# Patient Record
Sex: MALE | Race: WHITE | NOT HISPANIC OR LATINO | URBAN - NONMETROPOLITAN AREA
[De-identification: names, ages, dates, MRNs, and addresses within clinical notes are randomized per-mention and may not be internally consistent; named-entity substitution may affect disease eponyms.]

---

## 2018-01-15 ENCOUNTER — OFFICE VISIT (OUTPATIENT)
Dept: URGENT CARE | Facility: PHYSICIAN GROUP | Age: 23
End: 2018-01-15

## 2018-01-15 DIAGNOSIS — Z02.1 PRE-EMPLOYMENT DRUG SCREENING: ICD-10-CM

## 2018-01-15 LAB
AMP AMPHETAMINE: NORMAL
COC COCAINE: NORMAL
INT CON NEG: NORMAL
INT CON POS: NORMAL
MET METHAMPHETAMINES: NORMAL
OPI OPIATES: NORMAL
PCP PHENCYCLIDINE: NORMAL
POC DRUG COMMENT 753798-OCCUPATIONAL HEALTH: NEGATIVE
THC: NORMAL

## 2018-01-15 PROCEDURE — 80305 DRUG TEST PRSMV DIR OPT OBS: CPT | Performed by: PHYSICIAN ASSISTANT

## 2019-05-19 ENCOUNTER — HOSPITAL ENCOUNTER (INPATIENT)
Facility: MEDICAL CENTER | Age: 24
LOS: 1 days | DRG: 558 | End: 2019-05-20
Attending: EMERGENCY MEDICINE | Admitting: INTERNAL MEDICINE
Payer: COMMERCIAL

## 2019-05-19 ENCOUNTER — APPOINTMENT (OUTPATIENT)
Dept: RADIOLOGY | Facility: MEDICAL CENTER | Age: 24
DRG: 558 | End: 2019-05-19
Attending: EMERGENCY MEDICINE
Payer: COMMERCIAL

## 2019-05-19 DIAGNOSIS — M65.9 FLEXOR TENOSYNOVITIS OF FINGER: ICD-10-CM

## 2019-05-19 DIAGNOSIS — F32.A DEPRESSION, UNSPECIFIED DEPRESSION TYPE: ICD-10-CM

## 2019-05-19 PROBLEM — E66.9 OBESITY: Status: ACTIVE | Noted: 2019-05-19

## 2019-05-19 PROBLEM — Z72.0 TOBACCO ABUSE: Status: ACTIVE | Noted: 2019-05-19

## 2019-05-19 PROBLEM — G89.29 CHRONIC PAIN: Status: ACTIVE | Noted: 2019-05-19

## 2019-05-19 PROBLEM — F11.10 OPIOID ABUSE (HCC): Status: ACTIVE | Noted: 2019-05-19

## 2019-05-19 LAB
ALBUMIN SERPL BCP-MCNC: 4.3 G/DL (ref 3.2–4.9)
ALBUMIN/GLOB SERPL: 1.7 G/DL
ALP SERPL-CCNC: 48 U/L (ref 30–99)
ALT SERPL-CCNC: 25 U/L (ref 2–50)
ANION GAP SERPL CALC-SCNC: 8 MMOL/L (ref 0–11.9)
AST SERPL-CCNC: 19 U/L (ref 12–45)
BASOPHILS # BLD AUTO: 0.7 % (ref 0–1.8)
BASOPHILS # BLD: 0.04 K/UL (ref 0–0.12)
BILIRUB SERPL-MCNC: 0.3 MG/DL (ref 0.1–1.5)
BUN SERPL-MCNC: 8 MG/DL (ref 8–22)
CALCIUM SERPL-MCNC: 9.2 MG/DL (ref 8.5–10.5)
CHLORIDE SERPL-SCNC: 104 MMOL/L (ref 96–112)
CO2 SERPL-SCNC: 29 MMOL/L (ref 20–33)
CREAT SERPL-MCNC: 0.95 MG/DL (ref 0.5–1.4)
CRP SERPL HS-MCNC: 0.33 MG/DL (ref 0–0.75)
EOSINOPHIL # BLD AUTO: 0.14 K/UL (ref 0–0.51)
EOSINOPHIL NFR BLD: 2.4 % (ref 0–6.9)
ERYTHROCYTE [DISTWIDTH] IN BLOOD BY AUTOMATED COUNT: 39 FL (ref 35.9–50)
ERYTHROCYTE [SEDIMENTATION RATE] IN BLOOD BY WESTERGREN METHOD: 3 MM/HOUR (ref 0–15)
GLOBULIN SER CALC-MCNC: 2.5 G/DL (ref 1.9–3.5)
GLUCOSE SERPL-MCNC: 132 MG/DL (ref 65–99)
HCT VFR BLD AUTO: 44 % (ref 42–52)
HGB BLD-MCNC: 14.5 G/DL (ref 14–18)
IMM GRANULOCYTES # BLD AUTO: 0.01 K/UL (ref 0–0.11)
IMM GRANULOCYTES NFR BLD AUTO: 0.2 % (ref 0–0.9)
LYMPHOCYTES # BLD AUTO: 2.2 K/UL (ref 1–4.8)
LYMPHOCYTES NFR BLD: 37 % (ref 22–41)
MCH RBC QN AUTO: 28.4 PG (ref 27–33)
MCHC RBC AUTO-ENTMCNC: 33 G/DL (ref 33.7–35.3)
MCV RBC AUTO: 86.3 FL (ref 81.4–97.8)
MONOCYTES # BLD AUTO: 0.32 K/UL (ref 0–0.85)
MONOCYTES NFR BLD AUTO: 5.4 % (ref 0–13.4)
NEUTROPHILS # BLD AUTO: 3.24 K/UL (ref 1.82–7.42)
NEUTROPHILS NFR BLD: 54.3 % (ref 44–72)
NRBC # BLD AUTO: 0 K/UL
NRBC BLD-RTO: 0 /100 WBC
PLATELET # BLD AUTO: 175 K/UL (ref 164–446)
PMV BLD AUTO: 10.3 FL (ref 9–12.9)
POTASSIUM SERPL-SCNC: 4.1 MMOL/L (ref 3.6–5.5)
PROT SERPL-MCNC: 6.8 G/DL (ref 6–8.2)
RBC # BLD AUTO: 5.1 M/UL (ref 4.7–6.1)
SODIUM SERPL-SCNC: 141 MMOL/L (ref 135–145)
WBC # BLD AUTO: 6 K/UL (ref 4.8–10.8)

## 2019-05-19 PROCEDURE — 700102 HCHG RX REV CODE 250 W/ 637 OVERRIDE(OP)

## 2019-05-19 PROCEDURE — 99223 1ST HOSP IP/OBS HIGH 75: CPT | Mod: AI | Performed by: INTERNAL MEDICINE

## 2019-05-19 PROCEDURE — 99407 BEHAV CHNG SMOKING > 10 MIN: CPT | Performed by: INTERNAL MEDICINE

## 2019-05-19 PROCEDURE — A9270 NON-COVERED ITEM OR SERVICE: HCPCS | Performed by: FAMILY MEDICINE

## 2019-05-19 PROCEDURE — 85025 COMPLETE CBC W/AUTO DIFF WBC: CPT

## 2019-05-19 PROCEDURE — 700105 HCHG RX REV CODE 258: Performed by: INTERNAL MEDICINE

## 2019-05-19 PROCEDURE — 700111 HCHG RX REV CODE 636 W/ 250 OVERRIDE (IP): Performed by: INTERNAL MEDICINE

## 2019-05-19 PROCEDURE — 90715 TDAP VACCINE 7 YRS/> IM: CPT | Performed by: EMERGENCY MEDICINE

## 2019-05-19 PROCEDURE — 700111 HCHG RX REV CODE 636 W/ 250 OVERRIDE (IP)

## 2019-05-19 PROCEDURE — 770006 HCHG ROOM/CARE - MED/SURG/GYN SEMI*

## 2019-05-19 PROCEDURE — 96365 THER/PROPH/DIAG IV INF INIT: CPT

## 2019-05-19 PROCEDURE — 86140 C-REACTIVE PROTEIN: CPT

## 2019-05-19 PROCEDURE — 700102 HCHG RX REV CODE 250 W/ 637 OVERRIDE(OP): Performed by: FAMILY MEDICINE

## 2019-05-19 PROCEDURE — 96375 TX/PRO/DX INJ NEW DRUG ADDON: CPT

## 2019-05-19 PROCEDURE — A9270 NON-COVERED ITEM OR SERVICE: HCPCS | Performed by: INTERNAL MEDICINE

## 2019-05-19 PROCEDURE — 73130 X-RAY EXAM OF HAND: CPT | Mod: RT

## 2019-05-19 PROCEDURE — 700102 HCHG RX REV CODE 250 W/ 637 OVERRIDE(OP): Performed by: INTERNAL MEDICINE

## 2019-05-19 PROCEDURE — 700111 HCHG RX REV CODE 636 W/ 250 OVERRIDE (IP): Performed by: EMERGENCY MEDICINE

## 2019-05-19 PROCEDURE — A9270 NON-COVERED ITEM OR SERVICE: HCPCS | Performed by: PHYSICIAN ASSISTANT

## 2019-05-19 PROCEDURE — 700105 HCHG RX REV CODE 258: Performed by: EMERGENCY MEDICINE

## 2019-05-19 PROCEDURE — 51798 US URINE CAPACITY MEASURE: CPT

## 2019-05-19 PROCEDURE — 85652 RBC SED RATE AUTOMATED: CPT

## 2019-05-19 PROCEDURE — 90471 IMMUNIZATION ADMIN: CPT

## 2019-05-19 PROCEDURE — 99285 EMERGENCY DEPT VISIT HI MDM: CPT

## 2019-05-19 PROCEDURE — A9270 NON-COVERED ITEM OR SERVICE: HCPCS

## 2019-05-19 PROCEDURE — 96366 THER/PROPH/DIAG IV INF ADDON: CPT

## 2019-05-19 PROCEDURE — 700102 HCHG RX REV CODE 250 W/ 637 OVERRIDE(OP): Performed by: PHYSICIAN ASSISTANT

## 2019-05-19 PROCEDURE — 80053 COMPREHEN METABOLIC PANEL: CPT

## 2019-05-19 RX ORDER — DIPHENHYDRAMINE HCL 25 MG
50 TABLET ORAL EVERY 6 HOURS PRN
Status: DISCONTINUED | OUTPATIENT
Start: 2019-05-19 | End: 2019-05-20 | Stop reason: HOSPADM

## 2019-05-19 RX ORDER — PRAZOSIN HYDROCHLORIDE 1 MG/1
1 CAPSULE ORAL NIGHTLY
COMMUNITY

## 2019-05-19 RX ORDER — ZOLPIDEM TARTRATE 5 MG/1
10 TABLET ORAL NIGHTLY PRN
Status: DISCONTINUED | OUTPATIENT
Start: 2019-05-19 | End: 2019-05-20 | Stop reason: HOSPADM

## 2019-05-19 RX ORDER — BACLOFEN 10 MG/1
10 TABLET ORAL 3 TIMES DAILY
COMMUNITY

## 2019-05-19 RX ORDER — OXYCODONE HYDROCHLORIDE 5 MG/1
10 TABLET ORAL EVERY 4 HOURS PRN
Status: DISCONTINUED | OUTPATIENT
Start: 2019-05-19 | End: 2019-05-19

## 2019-05-19 RX ORDER — POLYETHYLENE GLYCOL 3350 17 G/17G
1 POWDER, FOR SOLUTION ORAL
Status: DISCONTINUED | OUTPATIENT
Start: 2019-05-19 | End: 2019-05-20 | Stop reason: HOSPADM

## 2019-05-19 RX ORDER — AMOXICILLIN 250 MG
2 CAPSULE ORAL 2 TIMES DAILY
Status: DISCONTINUED | OUTPATIENT
Start: 2019-05-19 | End: 2019-05-20 | Stop reason: HOSPADM

## 2019-05-19 RX ORDER — BUPRENORPHINE HYDROCHLORIDE AND NALOXONE HYDROCHLORIDE DIHYDRATE 8; 2 MG/1; MG/1
1 TABLET SUBLINGUAL 2 TIMES DAILY
Status: DISCONTINUED | OUTPATIENT
Start: 2019-05-19 | End: 2019-05-20 | Stop reason: HOSPADM

## 2019-05-19 RX ORDER — GABAPENTIN 300 MG/1
300 CAPSULE ORAL 3 TIMES DAILY
Status: DISCONTINUED | OUTPATIENT
Start: 2019-05-19 | End: 2019-05-20 | Stop reason: HOSPADM

## 2019-05-19 RX ORDER — SERTRALINE HYDROCHLORIDE 100 MG/1
100 TABLET, FILM COATED ORAL DAILY
COMMUNITY

## 2019-05-19 RX ORDER — BUPRENORPHINE HYDROCHLORIDE AND NALOXONE HYDROCHLORIDE DIHYDRATE 8; 2 MG/1; MG/1
1 TABLET SUBLINGUAL DAILY
COMMUNITY

## 2019-05-19 RX ORDER — SERTRALINE HYDROCHLORIDE 100 MG/1
100 TABLET, FILM COATED ORAL DAILY
Status: DISCONTINUED | OUTPATIENT
Start: 2019-05-19 | End: 2019-05-20 | Stop reason: HOSPADM

## 2019-05-19 RX ORDER — MELOXICAM 7.5 MG/1
7.5 TABLET ORAL DAILY
COMMUNITY

## 2019-05-19 RX ORDER — ONDANSETRON 2 MG/ML
4 INJECTION INTRAMUSCULAR; INTRAVENOUS EVERY 4 HOURS PRN
Status: DISCONTINUED | OUTPATIENT
Start: 2019-05-19 | End: 2019-05-20 | Stop reason: HOSPADM

## 2019-05-19 RX ORDER — GABAPENTIN 300 MG/1
300 CAPSULE ORAL 3 TIMES DAILY
COMMUNITY

## 2019-05-19 RX ORDER — HYDROMORPHONE HYDROCHLORIDE 1 MG/ML
0.5 INJECTION, SOLUTION INTRAMUSCULAR; INTRAVENOUS; SUBCUTANEOUS ONCE
Status: COMPLETED | OUTPATIENT
Start: 2019-05-19 | End: 2019-05-19

## 2019-05-19 RX ORDER — KETOROLAC TROMETHAMINE 30 MG/ML
15 INJECTION, SOLUTION INTRAMUSCULAR; INTRAVENOUS ONCE
Status: COMPLETED | OUTPATIENT
Start: 2019-05-19 | End: 2019-05-19

## 2019-05-19 RX ORDER — ACETAMINOPHEN 325 MG/1
650 TABLET ORAL EVERY 6 HOURS PRN
Status: DISCONTINUED | OUTPATIENT
Start: 2019-05-19 | End: 2019-05-20 | Stop reason: HOSPADM

## 2019-05-19 RX ORDER — PRAZOSIN HYDROCHLORIDE 1 MG/1
1 CAPSULE ORAL NIGHTLY
Status: DISCONTINUED | OUTPATIENT
Start: 2019-05-19 | End: 2019-05-20 | Stop reason: HOSPADM

## 2019-05-19 RX ORDER — KETOROLAC TROMETHAMINE 30 MG/ML
30 INJECTION, SOLUTION INTRAMUSCULAR; INTRAVENOUS EVERY 6 HOURS PRN
Status: DISCONTINUED | OUTPATIENT
Start: 2019-05-19 | End: 2019-05-20 | Stop reason: HOSPADM

## 2019-05-19 RX ORDER — BISACODYL 10 MG
10 SUPPOSITORY, RECTAL RECTAL
Status: DISCONTINUED | OUTPATIENT
Start: 2019-05-19 | End: 2019-05-20 | Stop reason: HOSPADM

## 2019-05-19 RX ORDER — ALPRAZOLAM 0.5 MG/1
0.5 TABLET ORAL 3 TIMES DAILY PRN
Status: DISCONTINUED | OUTPATIENT
Start: 2019-05-19 | End: 2019-05-20 | Stop reason: HOSPADM

## 2019-05-19 RX ORDER — SODIUM CHLORIDE 9 MG/ML
INJECTION, SOLUTION INTRAVENOUS CONTINUOUS
Status: DISCONTINUED | OUTPATIENT
Start: 2019-05-19 | End: 2019-05-20 | Stop reason: HOSPADM

## 2019-05-19 RX ORDER — IBUPROFEN 800 MG/1
800 TABLET ORAL EVERY 6 HOURS PRN
Status: DISCONTINUED | OUTPATIENT
Start: 2019-05-19 | End: 2019-05-20 | Stop reason: HOSPADM

## 2019-05-19 RX ORDER — MAGNESIUM OXIDE 400 MG/1
400 TABLET ORAL DAILY
COMMUNITY

## 2019-05-19 RX ORDER — SODIUM CHLORIDE 9 MG/ML
1000 INJECTION, SOLUTION INTRAVENOUS
Status: DISCONTINUED | OUTPATIENT
Start: 2019-05-19 | End: 2019-05-20 | Stop reason: HOSPADM

## 2019-05-19 RX ORDER — IBUPROFEN 800 MG/1
800 TABLET ORAL EVERY 6 HOURS PRN
Status: DISCONTINUED | OUTPATIENT
Start: 2019-05-20 | End: 2019-05-19

## 2019-05-19 RX ORDER — OXYCODONE HYDROCHLORIDE AND ACETAMINOPHEN 5; 325 MG/1; MG/1
1 TABLET ORAL ONCE
Status: COMPLETED | OUTPATIENT
Start: 2019-05-19 | End: 2019-05-19

## 2019-05-19 RX ORDER — MELOXICAM 7.5 MG/1
7.5 TABLET ORAL DAILY
Status: DISCONTINUED | OUTPATIENT
Start: 2019-05-19 | End: 2019-05-19

## 2019-05-19 RX ORDER — ONDANSETRON 4 MG/1
4 TABLET, ORALLY DISINTEGRATING ORAL EVERY 4 HOURS PRN
Status: DISCONTINUED | OUTPATIENT
Start: 2019-05-19 | End: 2019-05-20 | Stop reason: HOSPADM

## 2019-05-19 RX ORDER — BUPRENORPHINE HYDROCHLORIDE AND NALOXONE HYDROCHLORIDE DIHYDRATE 8; 2 MG/1; MG/1
1 TABLET SUBLINGUAL DAILY
Status: DISCONTINUED | OUTPATIENT
Start: 2019-05-19 | End: 2019-05-19

## 2019-05-19 RX ADMIN — SODIUM CHLORIDE: 9 INJECTION, SOLUTION INTRAVENOUS at 08:13

## 2019-05-19 RX ADMIN — GABAPENTIN 300 MG: 300 CAPSULE ORAL at 17:22

## 2019-05-19 RX ADMIN — BUPRENORPHINE HYDROCHLORIDE AND NALOXONE HYDROCHLORIDE DIHYDRATE 1 TABLET: 8; 2 TABLET SUBLINGUAL at 17:22

## 2019-05-19 RX ADMIN — SERTRALINE HYDROCHLORIDE 100 MG: 100 TABLET ORAL at 05:55

## 2019-05-19 RX ADMIN — VANCOMYCIN HYDROCHLORIDE 1700 MG: 100 INJECTION, POWDER, LYOPHILIZED, FOR SOLUTION INTRAVENOUS at 18:15

## 2019-05-19 RX ADMIN — ALPRAZOLAM 0.5 MG: 0.5 TABLET ORAL at 15:36

## 2019-05-19 RX ADMIN — VANCOMYCIN HYDROCHLORIDE 1700 MG: 100 INJECTION, POWDER, LYOPHILIZED, FOR SOLUTION INTRAVENOUS at 12:09

## 2019-05-19 RX ADMIN — SODIUM CHLORIDE: 9 INJECTION, SOLUTION INTRAVENOUS at 23:27

## 2019-05-19 RX ADMIN — VANCOMYCIN HYDROCHLORIDE 2800 MG: 100 INJECTION, POWDER, LYOPHILIZED, FOR SOLUTION INTRAVENOUS at 03:08

## 2019-05-19 RX ADMIN — KETOROLAC TROMETHAMINE 30 MG: 30 INJECTION, SOLUTION INTRAMUSCULAR; INTRAVENOUS at 21:43

## 2019-05-19 RX ADMIN — ALPRAZOLAM 0.5 MG: 0.5 TABLET ORAL at 10:46

## 2019-05-19 RX ADMIN — OXYCODONE AND ACETAMINOPHEN 1 TABLET: 5; 325 TABLET ORAL at 02:39

## 2019-05-19 RX ADMIN — KETOROLAC TROMETHAMINE 15 MG: 30 INJECTION, SOLUTION INTRAMUSCULAR; INTRAVENOUS at 02:39

## 2019-05-19 RX ADMIN — ACETAMINOPHEN 650 MG: 325 TABLET, FILM COATED ORAL at 04:59

## 2019-05-19 RX ADMIN — GABAPENTIN 300 MG: 300 CAPSULE ORAL at 11:09

## 2019-05-19 RX ADMIN — KETOROLAC TROMETHAMINE 30 MG: 30 INJECTION, SOLUTION INTRAMUSCULAR; INTRAVENOUS at 08:51

## 2019-05-19 RX ADMIN — ZOLPIDEM TARTRATE 10 MG: 5 TABLET ORAL at 21:43

## 2019-05-19 RX ADMIN — IBUPROFEN 800 MG: 800 TABLET, FILM COATED ORAL at 18:15

## 2019-05-19 RX ADMIN — SODIUM CHLORIDE 3 G: 900 INJECTION INTRAVENOUS at 17:23

## 2019-05-19 RX ADMIN — PRAZOSIN HYDROCHLORIDE 1 MG: 1 CAPSULE ORAL at 20:13

## 2019-05-19 RX ADMIN — ACETAMINOPHEN 650 MG: 325 TABLET, FILM COATED ORAL at 11:09

## 2019-05-19 RX ADMIN — CLOSTRIDIUM TETANI TOXOID ANTIGEN (FORMALDEHYDE INACTIVATED), CORYNEBACTERIUM DIPHTHERIAE TOXOID ANTIGEN (FORMALDEHYDE INACTIVATED), BORDETELLA PERTUSSIS TOXOID ANTIGEN (GLUTARALDEHYDE INACTIVATED), BORDETELLA PERTUSSIS FILAMENTOUS HEMAGGLUTININ ANTIGEN (FORMALDEHYDE INACTIVATED), BORDETELLA PERTUSSIS PERTACTIN ANTIGEN, AND BORDETELLA PERTUSSIS FIMBRIAE 2/3 ANTIGEN 0.5 ML: 5; 2; 2.5; 5; 3; 5 INJECTION, SUSPENSION INTRAMUSCULAR at 01:46

## 2019-05-19 RX ADMIN — DIPHENHYDRAMINE HCL 50 MG: 25 TABLET ORAL at 18:15

## 2019-05-19 RX ADMIN — SENNOSIDES, DOCUSATE SODIUM 2 TABLET: 50; 8.6 TABLET, FILM COATED ORAL at 17:22

## 2019-05-19 RX ADMIN — KETOROLAC TROMETHAMINE 30 MG: 30 INJECTION, SOLUTION INTRAMUSCULAR; INTRAVENOUS at 15:10

## 2019-05-19 RX ADMIN — ALPRAZOLAM 0.5 MG: 0.5 TABLET ORAL at 23:19

## 2019-05-19 RX ADMIN — MELOXICAM 7.5 MG: 7.5 TABLET ORAL at 05:55

## 2019-05-19 RX ADMIN — SODIUM CHLORIDE 3 G: 900 INJECTION INTRAVENOUS at 05:02

## 2019-05-19 RX ADMIN — SODIUM CHLORIDE 3 G: 900 INJECTION INTRAVENOUS at 23:21

## 2019-05-19 RX ADMIN — SODIUM CHLORIDE 3 G: 900 INJECTION INTRAVENOUS at 11:09

## 2019-05-19 RX ADMIN — HYDROMORPHONE HYDROCHLORIDE 0.5 MG: 1 INJECTION, SOLUTION INTRAMUSCULAR; INTRAVENOUS; SUBCUTANEOUS at 01:47

## 2019-05-19 RX ADMIN — BUPRENORPHINE HYDROCHLORIDE AND NALOXONE HYDROCHLORIDE DIHYDRATE 1 TABLET: 8; 2 TABLET SUBLINGUAL at 05:55

## 2019-05-19 RX ADMIN — GABAPENTIN 300 MG: 300 CAPSULE ORAL at 05:55

## 2019-05-19 ASSESSMENT — COGNITIVE AND FUNCTIONAL STATUS - GENERAL
DAILY ACTIVITIY SCORE: 21
SUGGESTED CMS G CODE MODIFIER MOBILITY: CH
DRESSING REGULAR UPPER BODY CLOTHING: A LITTLE
SUGGESTED CMS G CODE MODIFIER DAILY ACTIVITY: CJ
HELP NEEDED FOR BATHING: A LITTLE
DRESSING REGULAR LOWER BODY CLOTHING: A LITTLE
MOBILITY SCORE: 24

## 2019-05-19 ASSESSMENT — ENCOUNTER SYMPTOMS
HEADACHES: 0
DIARRHEA: 0
SPUTUM PRODUCTION: 0
SHORTNESS OF BREATH: 0
VOMITING: 0
NAUSEA: 0
BLURRED VISION: 0
BACK PAIN: 0
SEIZURES: 0
WHEEZING: 0
COUGH: 0
PALPITATIONS: 0
FEVER: 0
SORE THROAT: 0
DIZZINESS: 0
FOCAL WEAKNESS: 0
ABDOMINAL PAIN: 0
DIAPHORESIS: 0
BRUISES/BLEEDS EASILY: 0
BLOOD IN STOOL: 0
NECK PAIN: 0
CHILLS: 0
DEPRESSION: 1
FLANK PAIN: 0
MYALGIAS: 0

## 2019-05-19 ASSESSMENT — PATIENT HEALTH QUESTIONNAIRE - PHQ9
1. LITTLE INTEREST OR PLEASURE IN DOING THINGS: NOT AT ALL
SUM OF ALL RESPONSES TO PHQ9 QUESTIONS 1 AND 2: 0
2. FEELING DOWN, DEPRESSED, IRRITABLE, OR HOPELESS: NOT AT ALL

## 2019-05-19 ASSESSMENT — LIFESTYLE VARIABLES
EVER_SMOKED: YES
ALCOHOL_USE: NO

## 2019-05-19 ASSESSMENT — COPD QUESTIONNAIRES
DURING THE PAST 4 WEEKS HOW MUCH DID YOU FEEL SHORT OF BREATH: SOME OF THE TIME
DO YOU EVER COUGH UP ANY MUCUS OR PHLEGM?: YES, A FEW DAYS A WEEK OR MONTH
IN THE PAST 12 MONTHS DO YOU DO LESS THAN YOU USED TO BECAUSE OF YOUR BREATHING PROBLEMS: DISAGREE/UNSURE
COPD SCREENING SCORE: 4
HAVE YOU SMOKED AT LEAST 100 CIGARETTES IN YOUR ENTIRE LIFE: YES

## 2019-05-19 NOTE — CONSULTS
"5/19/2019    Jamey Gómez is a 24 y.o. male who presents after a mirror laceration with a right hand infection and is here for operative management. Patient denies numbness, parasthesias, loss of concousness or other trauma    Past Medical History:   Diagnosis Date   • Abnormal weight gain 6/15/2010   • Depression    • Hypoglycemia 6/15/2010   • Opioid abuse (HCC)    • PTSD (post-traumatic stress disorder)    • Suicidal ideations        Past Surgical History:   Procedure Laterality Date   • TONSILLECTOMY  4/2011       Medications  No current facility-administered medications on file prior to encounter.      Current Outpatient Prescriptions on File Prior to Encounter   Medication Sig Dispense Refill   • azithromycin (ZITHROMAX) 500 MG tablet Take 1 Tab by mouth every day. as directed 3 Tab 1       Allergies  Patient has no known allergies.    ROS  Right hand pain. All other systems were reviewed and found to be negative    History reviewed. No pertinent family history.    Social History     Social History   • Marital status: Single     Spouse name: N/A   • Number of children: N/A   • Years of education: N/A     Social History Main Topics   • Smoking status: Current Every Day Smoker     Packs/day: 1.00   • Smokeless tobacco: Never Used   • Alcohol use No   • Drug use: Yes      Comment: cocaine   • Sexual activity: Not on file     Other Topics Concern   • Not on file     Social History Narrative   • No narrative on file       Physical Exam  Vitals  /60   Pulse 84   Temp 36.6 °C (97.9 °F) (Temporal)   Resp 16   Ht 1.88 m (6' 2\")   Wt 113.4 kg (250 lb)   General: Well Developed, Well Nourished, no acute distress  HEENT: Normocephalic, atraumatic  Eyes: Anicteric, PERRLA, EOMI  Neck: Supple, nontender, no masses  Lungs: CTA, no wheezes or crackles  Heart: RRR, no murmurs, rubs or gallops  Abdomen: Soft, NT, ND  Pelvis: Stable to AP and Lateral Compression  Skin: Intact, no open wounds  Extremities: Right hand " pain and swelling, positive Kanavals signs  Neuro: M/R/U/A intact  Vascular: 2+rad/Uln, Capillary refill <2 seconds    Radiographs:  DX-HAND 3+ RIGHT   Final Result         1.  No acute traumatic bony injury.   2.  Small irregular radiodense foreign body along the radial aspect of the long finger PIP joint, appearance suggests soft tissue foreign body.          Laboratory Values  Recent Labs      05/19/19   0145   WBC  6.0   RBC  5.10   HEMOGLOBIN  14.5   HEMATOCRIT  44.0   MCV  86.3   MCH  28.4   MCHC  33.0*   RDW  39.0   PLATELETCT  175   MPV  10.3     Recent Labs      05/19/19   0145   SODIUM  141   POTASSIUM  4.1   CHLORIDE  104   CO2  29   GLUCOSE  132*   BUN  8             Impression:Flexor tenosynovitis Right hand    Plan:Operative intervention if no improvement with IV ABX in 24 hrs

## 2019-05-19 NOTE — DISCHARGE PLANNING
Care Transition Team Discharge Planning    Anticipated Discharge Disposition: TBD    Action: Lsw spoke to bedside RN and MD. Pt is upset as [facility] and [facility] to return. Pt will most likely not have his hand operated on.    Lsw met w/ pt who reported he wants an advocate. Pt states he has had a horrible three month period. He is very upset w/ the care he received earlier today. He requested and received the contact number for the hospital patient advocate. Lsw indicated the contact number is a voice mail and pt is instructed to leave a report. They will investigate and f/u up w/ him.       Barriers to Discharge: TBD    Plan: f/u w/ medical team

## 2019-05-19 NOTE — H&P
Hospital Medicine History & Physical Note    Date of Service  5/19/2019    Primary Care Physician  RUPESH Curtis.    Consultants  Ortho     Code Status  Full code    Chief Complaint  Right hand pain    History of Presenting Illness  24 y.o. male with a past medical history of depression, PTSD, chronic pain, opioid dependence who presented 5/19/2019 with right hand pain.  Patient injured his right hand on Monday after falling into room air.  Since then he has noted progressively worsening pain and is now unable to straighten his fingers of his right hand.  Pain is worse with movement.  He denies any fevers or chills.  He denies any head injury, numbness or tingling.    Review of Systems  Review of Systems   Constitutional: Negative for chills, diaphoresis and fever.   HENT: Negative for hearing loss and sore throat.    Eyes: Negative for blurred vision.   Respiratory: Negative for cough, sputum production, shortness of breath and wheezing.    Cardiovascular: Negative for chest pain, palpitations and leg swelling.   Gastrointestinal: Negative for abdominal pain, blood in stool, diarrhea, nausea and vomiting.   Genitourinary: Negative for dysuria, flank pain and urgency.   Musculoskeletal: Negative for back pain, joint pain, myalgias and neck pain.        Right hand pain   Skin: Negative for rash.        Multiple abrasions noted on right hand   Neurological: Negative for dizziness, focal weakness, seizures and headaches.   Endo/Heme/Allergies: Does not bruise/bleed easily.   Psychiatric/Behavioral: Positive for depression. Negative for suicidal ideas.   All other systems reviewed and are negative.      Past Medical History   has a past medical history of Abnormal weight gain (6/15/2010); Depression; Hypoglycemia (6/15/2010); Opioid abuse (HCC); PTSD (post-traumatic stress disorder); and Suicidal ideations.    Surgical History   has a past surgical history that includes tonsillectomy (4/2011).      Family History  No pertinent family history    Social History   reports that he has been smoking.  He has been smoking about 1.00 pack per day. He has never used smokeless tobacco. He reports that he uses drugs. He reports that he does not drink alcohol.    Allergies  No Known Allergies    Medications  Prior to Admission Medications   Prescriptions Last Dose Informant Patient Reported? Taking?   azithromycin (ZITHROMAX) 500 MG tablet   No No   Sig: Take 1 Tab by mouth every day. as directed   baclofen (LIORESAL) 10 MG Tab   Yes Yes   Sig: Take 10 mg by mouth 3 times a day.   buprenorphine-naloxone (SUBOXONE) 8-2 MG SL Tab   Yes Yes   Sig: Place 1 Tab under tongue every day.   cholecalciferol (VITAMIN D3) 400 UNIT Tab   Yes Yes   Sig: Take 400 Units by mouth every day.   gabapentin (NEURONTIN) 300 MG Cap   Yes Yes   Sig: Take 300 mg by mouth 3 times a day.   magnesium oxide (MAG-OX) 400 MG Tab   Yes Yes   Sig: Take 400 mg by mouth every day.   meloxicam (MOBIC) 7.5 MG Tab   Yes Yes   Sig: Take 7.5 mg by mouth every day.   prazosin (MINIPRESS) 1 MG Cap   Yes Yes   Sig: Take 1 mg by mouth every evening.   sertraline (ZOLOFT) 100 MG Tab   Yes Yes   Sig: Take 100 mg by mouth every day.      Facility-Administered Medications: None       Physical Exam  Temp:  [36.6 °C (97.9 °F)] 36.6 °C (97.9 °F)  Pulse:  [92] 92  Resp:  [16] 16  BP: (143)/(62) 143/62    Physical Exam   Constitutional: He is oriented to person, place, and time. He appears well-developed and well-nourished. No distress.   HENT:   Head: Normocephalic and atraumatic.   Mouth/Throat: Oropharynx is clear and moist.   Eyes: Pupils are equal, round, and reactive to light. Conjunctivae are normal. No scleral icterus.   Neck: Normal range of motion. Neck supple.   Cardiovascular: Normal rate, regular rhythm and normal heart sounds.    Pulmonary/Chest: Effort normal and breath sounds normal. No respiratory distress. He has no wheezes. He has no rales.    Abdominal: Soft. Bowel sounds are normal. He exhibits no distension. There is no tenderness. There is no rebound.   Musculoskeletal: He exhibits edema and tenderness.   Limited range of motion of right hand due to pain   Lymphadenopathy:     He has no cervical adenopathy.   Neurological: He is alert and oriented to person, place, and time. No cranial nerve deficit. Coordination normal.   Skin: Skin is warm. No rash noted.   Right hand swelling with multiple abrasions    Psychiatric: He has a normal mood and affect. His behavior is normal.   Nursing note and vitals reviewed.      Laboratory:  Recent Labs      05/19/19 0145   WBC  6.0   RBC  5.10   HEMOGLOBIN  14.5   HEMATOCRIT  44.0   MCV  86.3   MCH  28.4   MCHC  33.0*   RDW  39.0   PLATELETCT  175   MPV  10.3     Recent Labs      05/19/19 0145   SODIUM  141   POTASSIUM  4.1   CHLORIDE  104   CO2  29   GLUCOSE  132*   BUN  8   CREATININE  0.95   CALCIUM  9.2     Recent Labs      05/19/19 0145   ALTSGPT  25   ASTSGOT  19   ALKPHOSPHAT  48   TBILIRUBIN  0.3   GLUCOSE  132*                 No results for input(s): TROPONINI in the last 72 hours.    Urinalysis:    No results found     Imaging:  DX-HAND 3+ RIGHT   Final Result         1.  No acute traumatic bony injury.   2.  Small irregular radiodense foreign body along the radial aspect of the long finger PIP joint, appearance suggests soft tissue foreign body.            Assessment/Plan:  I anticipate this patient will require at least two midnights for appropriate medical management, necessitating inpatient admission.    Tenosynovitis of right hand- (present on admission)   Assessment & Plan    I have started the patient on IV Unasyn and IV vancomycin.  Monitor for vancomycin toxicity and follow trough levels  Pain control with Tylenol and IV Toradol  Orthopedics has been consulted  PTOT     Obesity- (present on admission)   Assessment & Plan    Body mass index is 32.1 kg/m².  Pt educated on the increase of  morbidity and mortality associated with excess weight including DM, Heart Disease, HTN, stroke, and sleep apnea.  Pt advised weight loss of 5% through reduced calorie, low carb diet and 150 mins of exercise a week       Tobacco abuse- (present on admission)   Assessment & Plan    Tobacco cessation education provided for more than 10 minutes, discussed options of nicotine patch, medical treatment with wellbutrin and chantix. Discussed the risks of smoking including increased risk of heart disease, stroke, cancer and COPD. Discussed the benefits of quitting smoking. Nicotine replacement protocol will be provided to the patient.       Chronic pain- (present on admission)   Assessment & Plan    Continue Mobic and gabapentin     Opioid abuse (HCC)- (present on admission)   Assessment & Plan    Continue Suboxone     Depression- (present on admission)   Assessment & Plan    Continue Zoloft         VTE prophylaxis: SCD

## 2019-05-19 NOTE — PROGRESS NOTES
"[facility].  Patient states he was told that he would have surgery when he arrived here.  Per orthopedic surgery, trial of IV antibiotics for the next 24 hours and then will be reevaluated.  Patient is upset, angry and irritable, frustrated, he was verbally abusive initially when I came into the room with the RN.  He states \"nobody has done anything here.\"  He was demanding to see a [facility] advocate, and I told him that we did not have one in the hospital but we do have a , he demanded to see a  before I could speak or examine him.  I asked the  and her hospitalist RN to come and speak with him.  He was also very anxious, trial of Xanax was given which calmed him down.  He then informed the  and hospitalist RN that he could then see me.  He immediately apologized for his initial behavior, we discussed his plan of care, trial of IV antibiotics for the next 24 hours then reevaluation.  His RN also noted that there was still a very small piece of glass in his right hand in one of the digits.  Orthopedic surgery was then informed of this finding.      "

## 2019-05-19 NOTE — ED PROVIDER NOTES
ED Provider Note    Chief Complaint:   Right hand injury    HPI:  Jamey Gómez is a 24 y.o. male who presents as a transfer patient from the [facility] out of concern for flexor tenosynovitis.  6 days ago he fell while walking in the dark.  He tripped over his dog and fell forward striking a mirror with his right hand.  He is right-hand dominant.  He sustained several superficial lacerations which he cleaned on his own.  He did not seek medical attention at that time.  He states he was able to easily flex and extend all digits.  Over the past 24 to 48 hours he has noticed progressively worsening pain localized to the palmar aspect of the hand, most pronounced in the index finger and ring finger.  He is also noticed some associated overlying redness.  Pain has worsened since time of onset, progressing to digits held in passive flexion and mild digit swelling.  Patient has not had any fevers, there is no streaking from the hand, forearm is not involved.  He has no history of diabetes, no hyperglycemia, he is a daily smoker which would impair his immunity as well as his wound healing.  Symptoms are exacerbated by moving the hand, no alleviating factors identified.    Review of Systems:  See HPI for pertinent positives and negatives. All other systems negative.    Past Medical History:   has a past medical history of Abnormal weight gain (6/15/2010); Depression; Hypoglycemia (6/15/2010); Opioid abuse (HCC); PTSD (post-traumatic stress disorder); and Suicidal ideations.    Social History:  Social History     Social History Main Topics   • Smoking status: Current Every Day Smoker     Packs/day: 1.00   • Smokeless tobacco: Never Used   • Alcohol use No   • Drug use: Yes      Comment: cocaine   • Sexual activity: Not on file       Surgical History:   has a past surgical history that includes tonsillectomy (4/2011).    Current Medications:  Home Medications     Reviewed by Ade Stratton R.N. (Registered Nurse) on  "05/19/19 at 0102  Med List Status: Partial   Medication Last Dose Status   azithromycin (ZITHROMAX) 500 MG tablet  Active   baclofen (LIORESAL) 10 MG Tab  Active   buprenorphine-naloxone (SUBOXONE) 8-2 MG SL Tab  Active   cholecalciferol (VITAMIN D3) 400 UNIT Tab  Active   gabapentin (NEURONTIN) 300 MG Cap  Active   magnesium oxide (MAG-OX) 400 MG Tab  Active   meloxicam (MOBIC) 7.5 MG Tab  Active   prazosin (MINIPRESS) 1 MG Cap  Active   sertraline (ZOLOFT) 100 MG Tab  Active                Allergies:  No Known Allergies    Physical Exam:  Vital Signs: /62   Pulse 92   Temp 36.6 °C (97.9 °F) (Temporal)   Resp 16   Ht 1.88 m (6' 2\")   Wt 113.4 kg (250 lb)   BMI 32.10 kg/m²   Constitutional: Alert, no acute distress  HENT: Moist mucus membranes  Eyes: Pupils equal and reactive, normal conjunctiva  Neck: Supple, normal range of motion, no stridor  Cardiovascular: Extremities are warm and well perfused, no murmur appreciated, normal cardiac auscultation  Pulmonary: No respiratory distress, normal work of breathing  Abdomen: Soft, non-distended  Skin: Warm, dry, no rashes or lesions  Musculoskeletal: Right hand with index and ring finger held in passive flexion, there is pain with passive or active extension, mild soft tissue swelling, sensation intact throughout radial, median, and ulnar nerve distribution, mild redness overlying the palmar aspect of the affected digits  Neurologic: Alert, oriented, normal speech, normal motor function  Psychiatric: Normal and appropriate mood and affect    [facility].  Patient was seen in the emergency department today for right hand injury.  Noted to have several small abrasions to the hand.  Additionally having difficulty straightening fingers on the right hand.  He was concerned before hand infection.  He gave a dose of Ancef and transferred the patient to this facility for further evaluation.    Labs:  Labs Reviewed "   CBC WITH DIFFERENTIAL - Abnormal; Notable for the following:        Result Value    MCHC 33.0 (*)     All other components within normal limits   COMP METABOLIC PANEL - Abnormal; Notable for the following:     Glucose 132 (*)     All other components within normal limits   WESTERGREN SED RATE   CRP QUANTITIVE (NON-CARDIAC)   ESTIMATED GFR       Radiology:  DX-HAND 3+ RIGHT   Final Result         1.  No acute traumatic bony injury.   2.  Small irregular radiodense foreign body along the radial aspect of the long finger PIP joint, appearance suggests soft tissue foreign body.             ED Medications Administered:  Medications   vancomycin 2,800 mg in  mL IVPB (not administered)   tetanus-dipth-acell pertussis (ADACEL) inj 0.5 mL (0.5 mL Intramuscular Given 5/19/19 0146)   HYDROmorphone pf (DILAUDID) injection 0.5 mg (0.5 mg Intravenous Given 5/19/19 0147)   ketorolac (TORADOL) injection 15 mg (15 mg Intravenous Given 5/19/19 0239)   oxyCODONE-acetaminophen (PERCOCET) 5-325 MG per tablet 1 Tab (1 Tab Oral Given 5/19/19 0239)       Differential diagnosis:  Cellulitis, soft tissue injury, flexor tenosynovitis, occult fracture    MDM:  History and physical exam as documented above.  Patient presents with right hand injury concerning for flexor tenosynovitis.  He was treated with Ancef and transferred to this facility for hand consultation.    Case discussed with Dr. Donaldson. Recommends adding Vancomycin with plan to monitor throughout the morning.  If no improvement, he will take the patient to the operating room later today for washout.    On laboratory evaluation CMP is reassuring.  He has a normal white blood count at 6.0, no bands resulted at this time.  ESR and CRP are within normal limits.  Plain film demonstrates no acute traumatic bony injury.  Small radiodense foreign body noted along the radial aspect of the long finger PIP joint.    Case discussed with hospitalist who kindly agrees to admit the  patient with Dr. Donaldson, orthopedic surgeon, consulting.    Disposition:  Admit to hospitalist service in guarded condition    Final Impression:  1. Flexor tenosynovitis of finger      Electronically signed by: Alexandra Sanchez, 5/19/2019 6:39 AM

## 2019-05-19 NOTE — CARE PLAN
Problem: Pain Management  Goal: Pain level will decrease to patient's comfort goal  Continues to c/o r hand pain. Administering prn pain medications as ordered. Encouraging rest, repositioning, and elevation

## 2019-05-19 NOTE — PROGRESS NOTES
"Pharmacy Kinetics 24 y.o. male on vancomycin day # 1 2019    Currently on Vancomycin 2800 mg iv load, given @0300    Indication for Treatment: SSTI    Pertinent history per medical record: 25 yo male with PMH of PTSD & opioid abuse admitted on 2019 for tenosynovitis of right hand.  [facility] on [facility].  Pt is unable to bend 3 fingers of right hand after falling into a mirror.    Other antibiotics:   - Unasyn 3 g iv q6h    Allergies: Patient has no known allergies.     List concerns for renal function:   - None    Pertinent cultures to date:   - none    MRSA nares swab if pneumonia is a concern (ordered/positive/negative/n-a): n/a    Recent Labs      19   0145   WBC  6.0   NEUTSPOLYS  54.30     Recent Labs      19   0145   BUN  8   CREATININE  0.95   ALBUMIN  4.3     No results for input(s): VANCOTROUGH, VANCOPEAK, VANCORANDOM in the last 72 hours.No intake or output data in the 24 hours ending 19 0414   /62   Pulse 92   Temp 36.6 °C (97.9 °F) (Temporal)   Resp 16   Ht 1.88 m (6' 2\")   Wt 113.4 kg (250 lb)  Temp (24hrs), Av.6 °C (97.9 °F), Min:36.6 °C (97.9 °F), Max:36.6 °C (97.9 °F)      A/P   1. Vancomycin dose change: 1700 mg iv q8h, start @1100  2. Next vancomycin level: Prior to the 4th dose (not ordered)  3. Goal trough: 12 to 16 mcg/mL  4. Comments: Currently no concerns for renal accumulation, so full maintenance dose at 15 mg/kg iv q8h.  Trough to be assessed at steady state.  Pharmacy to follow and to make dose adjustments as appropriate.      En Townsend, PharmD        "

## 2019-05-19 NOTE — ASSESSMENT & PLAN NOTE
Body mass index is 32.1 kg/m².  Pt educated on the increase of morbidity and mortality associated with excess weight including DM, Heart Disease, HTN, stroke, and sleep apnea.  Pt advised weight loss of 5% through reduced calorie, low carb diet and 150 mins of exercise a week

## 2019-05-19 NOTE — ASSESSMENT & PLAN NOTE
I have started the patient on IV Unasyn and IV vancomycin.  Monitor for vancomycin toxicity and follow trough levels  Pain control with Tylenol and IV Toradol  Orthopedics has been consulted  PTOT

## 2019-05-19 NOTE — CARE PLAN
Problem: Communication  Goal: The ability to communicate needs accurately and effectively will improve  Patient using call bell to alert staff to needs.

## 2019-05-19 NOTE — ED TRIAGE NOTES
Chief Complaint   Patient presents with   • Hand Injury     [facility] d/t inj on monday   unable to bend 3 fingers of r hand after falling into mirror.

## 2019-05-19 NOTE — PROGRESS NOTES
Pharmacy Kinetics Addendum:    24 y.o. male on vancomycin day # 1 5/19/2019    Currently on Vancomycin 1700 mg IV q8h    Indication for Treatment: R hand tenosynovitis    Recent Labs      05/19/19   0145   BUN  8   CREATININE  0.95   ALBUMIN  4.3     No results for input(s): VANCOTROUGH, VANCOPEAK, VANCORANDOM in the last 72 hours.    A/P   1. Vancomycin dose change: Continue 1700 mg IV q8h (@ [id number])  2. Next vancomycin level: Tomorrow at 1130, before the 5th dose  3. Goal trough: 12-16 mcg/mL  4. Comments: Plan for pt to get IV abx x24h and if not better, go to OR. Will check a trough tomorrow before the 5th dose. Although pt won't be quite at steady state, want to ensure no rapid accumulation given BMI.    Shanice Paz, PharmD

## 2019-05-20 ENCOUNTER — PATIENT OUTREACH (OUTPATIENT)
Dept: HEALTH INFORMATION MANAGEMENT | Facility: OTHER | Age: 24
End: 2019-05-20

## 2019-05-20 VITALS
HEIGHT: 74 IN | TEMPERATURE: 97.6 F | BODY MASS INDEX: 32.08 KG/M2 | OXYGEN SATURATION: 95 % | WEIGHT: 250 LBS | SYSTOLIC BLOOD PRESSURE: 109 MMHG | HEART RATE: 83 BPM | DIASTOLIC BLOOD PRESSURE: 66 MMHG | RESPIRATION RATE: 16 BRPM

## 2019-05-20 LAB
ANION GAP SERPL CALC-SCNC: 4 MMOL/L (ref 0–11.9)
BASOPHILS # BLD AUTO: 0.5 % (ref 0–1.8)
BASOPHILS # BLD: 0.02 K/UL (ref 0–0.12)
BUN SERPL-MCNC: 11 MG/DL (ref 8–22)
CALCIUM SERPL-MCNC: 8.4 MG/DL (ref 8.5–10.5)
CHLORIDE SERPL-SCNC: 109 MMOL/L (ref 96–112)
CO2 SERPL-SCNC: 28 MMOL/L (ref 20–33)
CREAT SERPL-MCNC: 0.92 MG/DL (ref 0.5–1.4)
EOSINOPHIL # BLD AUTO: 0.16 K/UL (ref 0–0.51)
EOSINOPHIL NFR BLD: 3.9 % (ref 0–6.9)
ERYTHROCYTE [DISTWIDTH] IN BLOOD BY AUTOMATED COUNT: 41.4 FL (ref 35.9–50)
EST. AVERAGE GLUCOSE BLD GHB EST-MCNC: 108 MG/DL
GLUCOSE SERPL-MCNC: 107 MG/DL (ref 65–99)
HBA1C MFR BLD: 5.4 % (ref 0–5.6)
HCT VFR BLD AUTO: 41.6 % (ref 42–52)
HGB BLD-MCNC: 13.7 G/DL (ref 14–18)
IMM GRANULOCYTES # BLD AUTO: 0.01 K/UL (ref 0–0.11)
IMM GRANULOCYTES NFR BLD AUTO: 0.2 % (ref 0–0.9)
LYMPHOCYTES # BLD AUTO: 1.46 K/UL (ref 1–4.8)
LYMPHOCYTES NFR BLD: 35.8 % (ref 22–41)
MCH RBC QN AUTO: 29.7 PG (ref 27–33)
MCHC RBC AUTO-ENTMCNC: 32.9 G/DL (ref 33.7–35.3)
MCV RBC AUTO: 90.2 FL (ref 81.4–97.8)
MONOCYTES # BLD AUTO: 0.37 K/UL (ref 0–0.85)
MONOCYTES NFR BLD AUTO: 9.1 % (ref 0–13.4)
NEUTROPHILS # BLD AUTO: 2.06 K/UL (ref 1.82–7.42)
NEUTROPHILS NFR BLD: 50.5 % (ref 44–72)
NRBC # BLD AUTO: 0 K/UL
NRBC BLD-RTO: 0 /100 WBC
PLATELET # BLD AUTO: 146 K/UL (ref 164–446)
PMV BLD AUTO: 10.5 FL (ref 9–12.9)
POTASSIUM SERPL-SCNC: 4.6 MMOL/L (ref 3.6–5.5)
RBC # BLD AUTO: 4.61 M/UL (ref 4.7–6.1)
SODIUM SERPL-SCNC: 141 MMOL/L (ref 135–145)
TSH SERPL DL<=0.005 MIU/L-ACNC: 0.75 UIU/ML (ref 0.38–5.33)
WBC # BLD AUTO: 4.1 K/UL (ref 4.8–10.8)

## 2019-05-20 PROCEDURE — A9270 NON-COVERED ITEM OR SERVICE: HCPCS | Performed by: INTERNAL MEDICINE

## 2019-05-20 PROCEDURE — 80048 BASIC METABOLIC PNL TOTAL CA: CPT

## 2019-05-20 PROCEDURE — 99239 HOSP IP/OBS DSCHRG MGMT >30: CPT | Performed by: FAMILY MEDICINE

## 2019-05-20 PROCEDURE — 84443 ASSAY THYROID STIM HORMONE: CPT

## 2019-05-20 PROCEDURE — A9270 NON-COVERED ITEM OR SERVICE: HCPCS | Performed by: FAMILY MEDICINE

## 2019-05-20 PROCEDURE — 700102 HCHG RX REV CODE 250 W/ 637 OVERRIDE(OP): Performed by: INTERNAL MEDICINE

## 2019-05-20 PROCEDURE — 700111 HCHG RX REV CODE 636 W/ 250 OVERRIDE (IP): Performed by: INTERNAL MEDICINE

## 2019-05-20 PROCEDURE — 700102 HCHG RX REV CODE 250 W/ 637 OVERRIDE(OP): Performed by: FAMILY MEDICINE

## 2019-05-20 PROCEDURE — 83036 HEMOGLOBIN GLYCOSYLATED A1C: CPT

## 2019-05-20 PROCEDURE — 700105 HCHG RX REV CODE 258: Performed by: INTERNAL MEDICINE

## 2019-05-20 PROCEDURE — 36415 COLL VENOUS BLD VENIPUNCTURE: CPT

## 2019-05-20 PROCEDURE — 85025 COMPLETE CBC W/AUTO DIFF WBC: CPT

## 2019-05-20 RX ORDER — AMOXICILLIN AND CLAVULANATE POTASSIUM 875; 125 MG/1; MG/1
1 TABLET, FILM COATED ORAL 2 TIMES DAILY
Qty: 12 TAB | Refills: 0 | Status: SHIPPED | OUTPATIENT
Start: 2019-05-20 | End: 2019-05-26

## 2019-05-20 RX ORDER — DOXYCYCLINE 100 MG/1
100 CAPSULE ORAL 2 TIMES DAILY
Qty: 12 CAP | Refills: 0 | Status: SHIPPED | OUTPATIENT
Start: 2019-05-20 | End: 2019-05-26

## 2019-05-20 RX ORDER — ALPRAZOLAM 0.5 MG/1
0.5 TABLET ORAL 3 TIMES DAILY PRN
Qty: 9 TAB | Refills: 0 | Status: SHIPPED | OUTPATIENT
Start: 2019-05-20 | End: 2019-05-23

## 2019-05-20 RX ADMIN — GABAPENTIN 300 MG: 300 CAPSULE ORAL at 06:43

## 2019-05-20 RX ADMIN — SERTRALINE HYDROCHLORIDE 100 MG: 100 TABLET ORAL at 06:43

## 2019-05-20 RX ADMIN — KETOROLAC TROMETHAMINE 30 MG: 30 INJECTION, SOLUTION INTRAMUSCULAR; INTRAVENOUS at 10:01

## 2019-05-20 RX ADMIN — ALPRAZOLAM 0.5 MG: 0.5 TABLET ORAL at 09:56

## 2019-05-20 RX ADMIN — VANCOMYCIN HYDROCHLORIDE 1700 MG: 100 INJECTION, POWDER, LYOPHILIZED, FOR SOLUTION INTRAVENOUS at 03:21

## 2019-05-20 RX ADMIN — IBUPROFEN 800 MG: 800 TABLET, FILM COATED ORAL at 06:44

## 2019-05-20 RX ADMIN — SODIUM CHLORIDE 3 G: 900 INJECTION INTRAVENOUS at 06:43

## 2019-05-20 RX ADMIN — BUPRENORPHINE HYDROCHLORIDE AND NALOXONE HYDROCHLORIDE DIHYDRATE 1 TABLET: 8; 2 TABLET SUBLINGUAL at 06:43

## 2019-05-20 RX ADMIN — KETOROLAC TROMETHAMINE 30 MG: 30 INJECTION, SOLUTION INTRAMUSCULAR; INTRAVENOUS at 04:03

## 2019-05-20 RX ADMIN — IBUPROFEN 800 MG: 800 TABLET, FILM COATED ORAL at 00:44

## 2019-05-20 NOTE — CARE PLAN
Problem: Pain Management  Goal: Pain level will decrease to patient's comfort goal  Outcome: PROGRESSING AS EXPECTED  Pain scale explained to patient. Requests pain medications when appropriate. Medication administered as ordered per MAR.  Nonpharmacologic pain interventions in use.     Problem: Psychosocial Needs:  Goal: Level of anxiety will decrease  Outcome: PROGRESSING AS EXPECTED  Anxiety managed throughout shift. Coping strategies discussed with patient.  Medication administered as needed.

## 2019-05-20 NOTE — PROGRESS NOTES
Bedside shift report received from night RN.  Assumed care at 0715, patient awake in bed, belongings and call light within reach, PIV assessed CDI, infusing. Needs met at this time.    Reviewed current POC with family present. POC review with CNA including vital sign frequency/drains/mobility.  Labs, notes, orders reviewed at this time.

## 2019-05-20 NOTE — DISCHARGE INSTRUCTIONS
Discharge Instructions    Discharged to home by car with relative. Discharged via wheelchair, hospital escort: Yes.  Special equipment needed: Not Applicable    Be sure to schedule a follow-up appointment with your primary care doctor or any specialists as instructed.     Discharge Plan:   Diet Plan: Discussed  Activity Level: Discussed  Smoking Cessation Offered: Patient Refused  Confirmed Follow up Appointment: Patient to Call and Schedule Appointment  Confirmed Symptoms Management: Discussed  Medication Reconciliation Updated: Yes  Influenza Vaccine Indication: Not indicated: Previously immunized this influenza season and > 8 years of age    I understand that a diet low in cholesterol, fat, and sodium is recommended for good health. Unless I have been given specific instructions below for another diet, I accept this instruction as my diet prescription.   Other diet: regular    Special Instructions: None    · Is patient discharged on Warfarin / Coumadin?   No     Depression / Suicide Risk    As you are discharged from this [facility] facility, it is important to learn how to keep safe from harming yourself.    Recognize the warning signs:  · Abrupt changes in personality, positive or negative- including increase in energy   · Giving away possessions  · Change in eating patterns- significant weight changes-  positive or negative  · Change in sleeping patterns- unable to sleep or sleeping all the time   · Unwillingness or inability to communicate  · Depression  · Unusual sadness, discouragement and loneliness  · Talk of wanting to die  · Neglect of personal appearance   · Rebelliousness- reckless behavior  · Withdrawal from people/activities they love  · Confusion- inability to concentrate     If you or a loved one observes any of these behaviors or has concerns about self-harm, here's what you can do:  · Talk about it- your feelings and reasons for harming yourself  · Remove any means that you might use to hurt  yourself (examples: pills, rope, extension cords, firearm)  · Get professional help from the community (Mental Health, Substance Abuse, psychological counseling)  · Do not be alone:Call your Safe Contact- someone whom you trust who will be there for you.  · Call your local CRISIS HOTLINE 249-1270 or 125-669-0245  · Call your local Children's Mobile Crisis Response Team [place] (980) 850-0168 or www.[employer]  · Call the toll free National Suicide Prevention Hotlines   · National Suicide Prevention Lifeline 607-922-SGRU (0754)  · National Hope Line Network 800-SUICIDE (863-6549)

## 2019-05-20 NOTE — DISCHARGE SUMMARY
"Discharge Summary    CHIEF COMPLAINT ON ADMISSION  Chief Complaint   Patient presents with   • Hand Injury     [facility]       Reason for Admission  ems     Admission Date  5/19/2019    CODE STATUS  Full Code    HPI & HOSPITAL COURSE  This is a 24 y.o. male here with right hand tenosynovitis.  [facility] and then [facility] for [facility] surgery consult.  Orthopedic surgery came to see the patient and recommended trial of IV antibiotics for the next 24 hours and then reevaluation.  Patient was then placed on IV vancomycin and Unasyn. Patient is upset, angry and irritable, frustrated, he was verbally abusive initially when I came into the room with the RN.  He states \"nobody has done anything here.\"  Per patient he was told that he was transferred to the Surgery done. He was demanding to see a [facility] advocate, and I told him that we did not have one in the hospital but we do have a , he demanded to see a  before I could speak or examine him.  I asked the  and her hospitalist RN to come and speak with him.  He was also very anxious, trial of Xanax was given which calmed him down.  He then informed the  and hospitalist RN that he could then see me.  He immediately apologized for his initial behavior, we discussed his plan of care, trial of IV antibiotics for the next 24 hours then reevaluation.  His RN also noted that there was still a very small piece of glass in his right hand in one of the digits.  Orthopedic surgery was then informed of this finding.  Patient on reevaluation the next day shows very minimal swelling at this point as well as much less pain on his right hand.  Orthopedic surgery has cleared him for discharge.  [facility] or with the surgeon he was seen him.        Therefore, he is discharged in good and stable condition to home with " close outpatient follow-up.    The patient recovered much more quickly than anticipated on admission.    Discharge Date  5/20/2019    FOLLOW UP ITEMS POST DISCHARGE  Follow-up with orthopedic surgery    DISCHARGE DIAGNOSES  Active Problems:    Tenosynovitis of right hand POA: Yes    Depression POA: Yes    Opioid abuse (HCC) POA: Yes    Chronic pain POA: Yes    Tobacco abuse POA: Yes    Obesity POA: Yes  Resolved Problems:    * No resolved hospital problems. *      FOLLOW UP  No future appointments.  [facility]  [address] [facility] [address]  [phone]    Schedule an appointment as soon as possible for a visit in 1 week  Hospital follow up with your primary care provider.     Tyson Donaldson M.D.  [address] [facility] [id number] [phone]    Schedule an appointment as soon as possible for a visit in 1 week  For wound re-check      MEDICATIONS ON DISCHARGE     Medication List      START taking these medications      Instructions   ALPRAZolam 0.5 MG Tabs  Commonly known as:  XANAX   Take 1 Tab by mouth 3 times a day as needed for Anxiety for up to 3 days.  Dose:  0.5 mg     amoxicillin-clavulanate 875-125 MG Tabs  Commonly known as:  AUGMENTIN   Take 1 Tab by mouth 2 times a day for 6 days.  Dose:  1 Tab     doxycycline 100 MG capsule  Commonly known as:  MONODOX   Take 1 Cap by mouth 2 times a day for 6 days.  Dose:  100 mg        CONTINUE taking these medications      Instructions   baclofen 10 MG Tabs  Commonly known as:  LIORESAL   Take 10 mg by mouth 3 times a day.  Dose:  10 mg     buprenorphine-naloxone 8-2 MG Subl  Commonly known as:  SUBOXONE   Place 1 Tab under tongue every day.  Dose:  1 Tab     cholecalciferol 400 UNIT Tabs  Commonly known as:  VITAMIN D3   Take 400 Units by mouth every day.  Dose:  400 Units     gabapentin 300 MG Caps  Commonly known as:  NEURONTIN   Take 300 mg by mouth 3 times a day.  Dose:  300 mg     magnesium oxide 400 MG  Tabs  Commonly known as:  MAG-OX   Take 400 mg by mouth every day.  Dose:  400 mg     meloxicam 7.5 MG Tabs  Commonly known as:  MOBIC   Take 7.5 mg by mouth every day.  Dose:  7.5 mg     prazosin 1 MG Caps  Commonly known as:  MINIPRESS   Take 1 mg by mouth every evening.  Dose:  1 mg     sertraline 100 MG Tabs  Commonly known as:  ZOLOFT   Take 100 mg by mouth every day.  Dose:  100 mg        STOP taking these medications    azithromycin 500 MG tablet  Commonly known as:  ZITHROMAX            Allergies  No Known Allergies    DIET  Orders Placed This Encounter   Procedures   • Diet Order Regular     Standing Status:   Standing     Number of Occurrences:   1     Order Specific Question:   Diet:     Answer:   Regular [1]       ACTIVITY  As tolerated.  5-lb weight restriction RIGHT arm    CONSULTATIONS  Orthopedic surgery - [place]    PROCEDURES  None    LABORATORY  Lab Results   Component Value Date    SODIUM 141 05/20/2019    POTASSIUM 4.6 05/20/2019    CHLORIDE 109 05/20/2019    CO2 28 05/20/2019    GLUCOSE 107 (H) 05/20/2019    BUN 11 05/20/2019    CREATININE 0.92 05/20/2019        Lab Results   Component Value Date    WBC 4.1 (L) 05/20/2019    HEMOGLOBIN 13.7 (L) 05/20/2019    HEMATOCRIT 41.6 (L) 05/20/2019    PLATELETCT 146 (L) 05/20/2019        Total time of the discharge process exceeds 40 minutes.

## 2019-05-20 NOTE — PROGRESS NOTES
"Afternoon progresss note  Nurse able to retrieve two small slivers of glass at bedside.  Hand same as before, neither better nor worse, itching now    /80   Pulse 86   Temp 36.1 °C (97 °F) (Temporal)   Resp 16   Ht 1.88 m (6' 2\")   Wt 113.4 kg (250 lb)   SpO2 95%   BMI 32.10 kg/m²   Skin is clean, dry, and intact except for small lacs with band-aids. No heat erythema or gross swelling over dominant right hand. Patient clearly fires forearm flexors, forearm extensors, and moves all five fingers without issue . Sensation is intact to light touch throughout median, ulnar, and radial nerve distributions. Strong and palpable radial pulses with capillary refill less than 2 seconds. No arm or hand discomfort.    Recent Labs      05/19/19   0145   WBC  6.0   RBC  5.10   HEMOGLOBIN  14.5   HEMATOCRIT  44.0   MCV  86.3   MCH  28.4   RDW  39.0   PLATELETCT  175   MPV  10.3   NEUTSPOLYS  54.30   LYMPHOCYTES  37.00   MONOCYTES  5.40   EOSINOPHILS  2.40   BASOPHILS  0.70     A/ R hand lacs / mirror, on IV ABX  P/ NPO p MN, re-eval in AM  "

## 2019-05-28 NOTE — DOCUMENTATION QUERY
Count includes the [facility]                                                                       Query Response Note      PATIENT:               DALE GEORGE  ACCT #:                  [id number]  MRN:                     [id number]  :                      1995  ADMIT DATE:       2019 12:36 AM  DISCH DATE:        2019 1:51 PM  RESPONDING  PROVIDER #:        [id number]           QUERY TEXT:    Depression is documented in the Medical Record.  Please specify the type.    NOTE:  If an appropriate response is not listed below, please respond with a new note.              The patient's Clinical Indicators include:  History and physical report, progress notes, and discharge summary state-  Depression- (present on admission)  Assessment & Plan    Continue Zoloft    Query created by: Olvin Cantrell on 2019 5:45 AM    RESPONSE TEXT:    Unable to determine          Electronically signed by:  ELISSA MARQUEZ MD 2019 7:54 AM

## 2020-03-02 NOTE — ADDENDUM NOTE
Encounter addended by: Helena Urrutia, PharmD on: 3/1/2020 4:40 PM   Actions taken: Immunization record saved